# Patient Record
(demographics unavailable — no encounter records)

---

## 2024-12-27 NOTE — HISTORY OF PRESENT ILLNESS
[FreeTextEntry1] : This is a 28 y/o M with a hx of recently diagnosed premature CAD, presented with unstable angina and had markedly abnormal Stress Echo (with reduction in EF from 55 to 35% with stress). Had LHC at [facility] demonstrating fibro-fatty plaque in proximal LAD - requiring LOUIS x2 (4.0x18 and 4.0x22mm, post dilated to 5.0 proximally).   TTE - LVEF 57%, normal function, normal RV, normal valves   Currently is feeling well. No residual CP, SOB. Denies orthopnea, PND, LE edema, palpitations. No lightheadedness or syncope. Discharge medications: ASA/Plavix, Atorva 40, Metop 25 XL  Social Hx: Family Hx:

## 2024-12-27 NOTE — ASSESSMENT
[FreeTextEntry1] : 26 y/o M with a hx of recently diagnosed premature CAD, presented with unstable angina and had markedly abnormal Stress Echo (with reduction in EF from 55 to 35% with stress). Had LHC at [facility] demonstrating fibro-fatty plaque in proximal LAD - requiring LOUIS x2 (4.0x18 and 4.0x22mm, post dilated to 5.0 proximally).   1. CAD s/p LOUIS x2 to pLAD (unstable angina) 2. Premature ASCVD, fibrofatty plaque in CAD, mild in intra-cranial vessels  - C/w ASA/Plavix for 12 months after which single anti-platelet therapy lifelong - Increase Atorvastatin to 80 mg qD  - D/C metoprolol (limited evidence in normal EF patient, and now no residual angina) - Will obtain follow up CCTA next year - if rapid progression of plaque, will need transition to [id number]  - ASCVD screening for patients brother to be arranged   Drewmakenna Hodges MD Interventional Cardiology

## 2024-12-27 NOTE — REASON FOR VISIT
[Symptom and Test Evaluation] : symptom and test evaluation [Coronary Artery Disease] : coronary artery disease [FreeTextEntry1] : CV New Patient

## 2025-06-24 NOTE — ASSESSMENT
[FreeTextEntry1] : 29 y/o M with a hx of premature CAD, presented w/ unstable angina and had markedly abnormal Stress Echo (with reduction in EF from 55 to 35% with stress). Had [facility] 12/2024 demonstrating fibro-fatty plaque in proximal LAD - requiring LOUIS x2 (4.0x18 and 4.0x22mm, post dilated to 5.0 proximally). Now presents for follow up 6 months post PCI.   1. CAD s/p LOUIS x2 to pLAD (unstable angina) 2. Premature ASCVD, fibrofatty plaque in CAD, mild in intra-cranial vessels  - C/w ASA/Plavix for 12 months after which single anti-platelet therapy lifelong - C/w Atorvastatin to 80 mg qD - D/C metoprolol - Will obtain follow up CCTA next year - if rapid progression of plaque, will need transition to [id number]  Drew Hodges MD Interventional Cardiology

## 2025-06-24 NOTE — HISTORY OF PRESENT ILLNESS
[FreeTextEntry1] : 29 y/o M with a hx of premature CAD, presented w/ unstable angina and had markedly abnormal Stress Echo (with reduction in EF from 55 to 35% with stress). Had [facility] 12/2024 demonstrating fibro-fatty plaque in proximal LAD - requiring LOUIS x2 (4.0x18 and 4.0x22mm, post dilated to 5.0 proximally). Now presents for follow up 6 months post PCI.   Has been exercising daily without issue both cardio and weight lifting. No cardiac symptoms at all. Denies CP SOB orthopnea PND LE edema palpitations syncope - even at peak exertion he feels well.   D/C'd Metoprolol - medical regimen is ASA/Plavix and Atorvastatin  Vitals - notable for mild hypertension, /87, and 140/84  Otherwise unremarkable vitals and examination  Family screening with brother completed - normal TTE, CCTA, and labs